# Patient Record
Sex: FEMALE | Race: BLACK OR AFRICAN AMERICAN | NOT HISPANIC OR LATINO | URBAN - METROPOLITAN AREA
[De-identification: names, ages, dates, MRNs, and addresses within clinical notes are randomized per-mention and may not be internally consistent; named-entity substitution may affect disease eponyms.]

---
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
## 2024-08-12 NOTE — ED PROVIDER NOTE - DOMESTIC TRAVEL HIGH RISK QUESTION
No [Well-appearing] : well-appearing [Normocephalic] : normocephalic [No dysmorphic facial features] : no dysmorphic facial features [Conversant] : conversant [Follows instructions well] : follows instructions well [Full extraocular movements] : full extraocular movements [Gross hearing intact] : gross hearing intact [Gets up on table without difficulty] : gets up on table without difficulty [No abnormal involuntary movements] : no abnormal involuntary movements [Good walking balance] : good walking balance [de-identified] : no resp distress noted.  [de-identified] : avoids eye contact [de-identified] : delayed speech